# Patient Record
Sex: FEMALE | Race: BLACK OR AFRICAN AMERICAN | NOT HISPANIC OR LATINO | Employment: STUDENT | URBAN - METROPOLITAN AREA
[De-identification: names, ages, dates, MRNs, and addresses within clinical notes are randomized per-mention and may not be internally consistent; named-entity substitution may affect disease eponyms.]

---

## 2024-02-21 DIAGNOSIS — M67.01 ACHILLES TENDON CONTRACTURE, RIGHT: Primary | ICD-10-CM

## 2024-02-21 DIAGNOSIS — Q66.41: ICD-10-CM

## 2024-02-21 DIAGNOSIS — M21.41 ACQUIRED RIGHT FLAT FOOT: ICD-10-CM

## 2024-02-21 DIAGNOSIS — S96.811A RUPTURE OF PLANTARIS TENDON, RIGHT, INITIAL ENCOUNTER: ICD-10-CM

## 2024-02-21 RX ORDER — TRANEXAMIC ACID 10 MG/ML
1000 INJECTION, SOLUTION INTRAVENOUS ONCE
Status: CANCELLED | OUTPATIENT
Start: 2024-05-20 | End: 2024-02-21

## 2024-02-21 RX ORDER — CEFAZOLIN SODIUM 2 G/100ML
2 INJECTION, SOLUTION INTRAVENOUS ONCE
Status: CANCELLED | OUTPATIENT
Start: 2024-05-20 | End: 2024-02-21

## 2024-02-21 RX ORDER — SODIUM CHLORIDE, SODIUM LACTATE, POTASSIUM CHLORIDE, CALCIUM CHLORIDE 600; 310; 30; 20 MG/100ML; MG/100ML; MG/100ML; MG/100ML
100 INJECTION, SOLUTION INTRAVENOUS CONTINUOUS
Status: CANCELLED | OUTPATIENT
Start: 2024-05-20

## 2024-05-01 DIAGNOSIS — Z01.818 PREOP TESTING: Primary | ICD-10-CM

## 2024-05-06 ENCOUNTER — APPOINTMENT (OUTPATIENT)
Dept: ORTHOPEDIC SURGERY | Facility: CLINIC | Age: 19
End: 2024-05-06
Payer: COMMERCIAL

## 2024-05-06 ENCOUNTER — PRE-ADMISSION TESTING (OUTPATIENT)
Dept: PREADMISSION TESTING | Facility: HOSPITAL | Age: 19
End: 2024-05-06
Payer: COMMERCIAL

## 2024-05-06 VITALS
OXYGEN SATURATION: 99 % | HEART RATE: 62 BPM | RESPIRATION RATE: 18 BRPM | WEIGHT: 259.7 LBS | HEIGHT: 61 IN | TEMPERATURE: 97 F | BODY MASS INDEX: 49.03 KG/M2 | DIASTOLIC BLOOD PRESSURE: 84 MMHG | SYSTOLIC BLOOD PRESSURE: 160 MMHG

## 2024-05-06 DIAGNOSIS — M67.01 ACHILLES TENDON CONTRACTURE, RIGHT: ICD-10-CM

## 2024-05-06 DIAGNOSIS — M21.41 ACQUIRED RIGHT FLAT FOOT: ICD-10-CM

## 2024-05-06 DIAGNOSIS — Z01.818 PREOP TESTING: Primary | ICD-10-CM

## 2024-05-06 DIAGNOSIS — Q66.41: ICD-10-CM

## 2024-05-06 DIAGNOSIS — S96.811A RUPTURE OF PLANTARIS TENDON, RIGHT, INITIAL ENCOUNTER: ICD-10-CM

## 2024-05-06 LAB
ALBUMIN SERPL BCP-MCNC: 4 G/DL (ref 3.4–5)
ALP SERPL-CCNC: 76 U/L (ref 33–110)
ALT SERPL W P-5'-P-CCNC: 13 U/L (ref 7–45)
AMPHETAMINES UR QL SCN: NORMAL
ANION GAP SERPL CALC-SCNC: 10 MMOL/L (ref 10–20)
AST SERPL W P-5'-P-CCNC: 17 U/L (ref 9–39)
BARBITURATES UR QL SCN: NORMAL
BENZODIAZ UR QL SCN: NORMAL
BILIRUB SERPL-MCNC: 0.7 MG/DL (ref 0–1.2)
BUN SERPL-MCNC: 17 MG/DL (ref 6–23)
BZE UR QL SCN: NORMAL
CALCIUM SERPL-MCNC: 9 MG/DL (ref 8.6–10.3)
CANNABINOIDS UR QL SCN: NORMAL
CHLORIDE SERPL-SCNC: 107 MMOL/L (ref 98–107)
CO2 SERPL-SCNC: 23 MMOL/L (ref 21–32)
CREAT SERPL-MCNC: 0.76 MG/DL (ref 0.5–1.05)
EGFRCR SERPLBLD CKD-EPI 2021: >90 ML/MIN/1.73M*2
ERYTHROCYTE [DISTWIDTH] IN BLOOD BY AUTOMATED COUNT: 13 % (ref 11.5–14.5)
FENTANYL+NORFENTANYL UR QL SCN: NORMAL
GLUCOSE SERPL-MCNC: 96 MG/DL (ref 74–99)
HCT VFR BLD AUTO: 38.2 % (ref 36–46)
HGB BLD-MCNC: 12.3 G/DL (ref 12–16)
MCH RBC QN AUTO: 24.7 PG (ref 26–34)
MCHC RBC AUTO-ENTMCNC: 32.2 G/DL (ref 32–36)
MCV RBC AUTO: 77 FL (ref 80–100)
METHADONE UR QL SCN: NORMAL
NRBC BLD-RTO: 0 /100 WBCS (ref 0–0)
OPIATES UR QL SCN: NORMAL
OXYCODONE+OXYMORPHONE UR QL SCN: NORMAL
PCP UR QL SCN: NORMAL
PLATELET # BLD AUTO: 207 X10*3/UL (ref 150–450)
POTASSIUM SERPL-SCNC: 4.2 MMOL/L (ref 3.5–5.3)
PROT SERPL-MCNC: 7.1 G/DL (ref 6.4–8.2)
RBC # BLD AUTO: 4.97 X10*6/UL (ref 4–5.2)
SODIUM SERPL-SCNC: 136 MMOL/L (ref 136–145)
WBC # BLD AUTO: 3.1 X10*3/UL (ref 4.4–11.3)

## 2024-05-06 PROCEDURE — 93005 ELECTROCARDIOGRAM TRACING: CPT

## 2024-05-06 PROCEDURE — 99203 OFFICE O/P NEW LOW 30 MIN: CPT | Performed by: NURSE PRACTITIONER

## 2024-05-06 PROCEDURE — 85027 COMPLETE CBC AUTOMATED: CPT

## 2024-05-06 PROCEDURE — 87081 CULTURE SCREEN ONLY: CPT | Mod: PARLAB

## 2024-05-06 PROCEDURE — 36415 COLL VENOUS BLD VENIPUNCTURE: CPT

## 2024-05-06 PROCEDURE — 80307 DRUG TEST PRSMV CHEM ANLYZR: CPT

## 2024-05-06 PROCEDURE — 80053 COMPREHEN METABOLIC PANEL: CPT

## 2024-05-06 RX ORDER — LORATADINE 10 MG
10 TABLET,DISINTEGRATING ORAL DAILY
COMMUNITY

## 2024-05-06 RX ORDER — FLUTICASONE PROPIONATE 50 MCG
1 SPRAY, SUSPENSION (ML) NASAL DAILY
COMMUNITY

## 2024-05-06 RX ORDER — NORETHINDRONE ACETATE AND ETHINYL ESTRADIOL .5; 2.5 MG/1; UG/1
1 TABLET ORAL DAILY
COMMUNITY
End: 2024-05-06 | Stop reason: WASHOUT

## 2024-05-06 RX ORDER — CHLORHEXIDINE GLUCONATE ORAL RINSE 1.2 MG/ML
15 SOLUTION DENTAL DAILY
Qty: 30 ML | Refills: 0 | Status: SHIPPED | OUTPATIENT
Start: 2024-05-06 | End: 2024-05-08

## 2024-05-06 RX ORDER — FERROUS SULFATE 325(65) MG
65 TABLET, DELAYED RELEASE (ENTERIC COATED) ORAL
COMMUNITY
End: 2024-05-06 | Stop reason: WASHOUT

## 2024-05-06 RX ORDER — CHLORHEXIDINE GLUCONATE 40 MG/ML
SOLUTION TOPICAL DAILY
Qty: 118 ML | Refills: 0 | Status: SHIPPED | OUTPATIENT
Start: 2024-05-06 | End: 2024-05-11

## 2024-05-06 ASSESSMENT — DUKE ACTIVITY SCORE INDEX (DASI)
CAN YOU DO HEAVY WORK AROUND THE HOUSE LIKE SCRUBBING FLOORS OR LIFTING AND MOVING HEAVY FURNITURE: NO
CAN YOU WALK A BLOCK OR TWO ON LEVEL GROUND: YES
CAN YOU DO MODERATE WORK AROUND THE HOUSE LIKE VACUUMING, SWEEPING FLOORS OR CARRYING GROCERIES: YES
TOTAL_SCORE: 18.95
CAN YOU HAVE SEXUAL RELATIONS: NO
CAN YOU WALK INDOORS, SUCH AS AROUND YOUR HOUSE: YES
DASI METS SCORE: 5.1
CAN YOU DO LIGHT WORK AROUND THE HOUSE LIKE DUSTING OR WASHING DISHES: YES
CAN YOU PARTICIPATE IN MODERATE RECREATIONAL ACTIVITIES LIKE GOLF, BOWLING, DANCING, DOUBLES TENNIS OR THROWING A BASEBALL OR FOOTBALL: NO
CAN YOU PARTICIPATE IN STRENOUS SPORTS LIKE SWIMMING, SINGLES TENNIS, FOOTBALL, BASKETBALL, OR SKIING: NO
CAN YOU CLIMB A FLIGHT OF STAIRS OR WALK UP A HILL: YES
CAN YOU DO YARD WORK LIKE RAKING LEAVES, WEEDING OR PUSHING A MOWER: NO
CAN YOU TAKE CARE OF YOURSELF (EAT, DRESS, BATHE, OR USE TOILET): YES
CAN YOU RUN A SHORT DISTANCE: NO

## 2024-05-06 NOTE — CPM/PAT H&P
CPM/PAT Evaluation       Name: Merced Vicente (Merced Vicente)  /Age: 2005/ y.o.     In-Person       Chief Complaint: Right foot pain    Reports ongoing progressive pain worsened by activity including walking, standing and shoe wear.  Reports hx of fall down set of stairs back in 2023, felt immediate pain, swelling and bruising but thinking it was a sprain, didn't get medical attention until 2 months later when pain and swelling didn't resolve.  States than sought medical attention, was given a soft cast and been followed up by podiatry, including MRI showing need for surgery.  Currently having constant constant ache radiating from toes through back of foot with intermiittent sharp shooting worsen by walking, standing and shoes.  Currently wearing foot/ankle brace to Right foot.  Accompanied by mom Adrián who is attentive and supportive.  Patient remaining otherwise physically active, denies cardiac or respiratory symptoms.  No previous anesthesia.     Followed by PCP ([facility]) - last visit on 2022        No past medical history on file.    No past surgical history on file.    Patient  has no history on file for sexual activity.    No family history on file.    No Known Allergies    Prior to Admission medications    Medication Sig Start Date End Date Taking? Authorizing Provider   fluticasone (Flonase) 50 mcg/actuation nasal spray Administer 1 spray into each nostril once daily. Shake gently. Before first use, prime pump. After use, clean tip and replace cap.    Historical Provider, MD   loratadine (Claritin Reditabs) 10 mg disintegrating tablet Take 1 tablet (10 mg) by mouth once daily.    Historical Provider, MD   ferrous sulfate 325 (65 Fe) MG EC tablet Take 65 mg by mouth 3 times a day with meals. Do not crush, chew, or split.  24  Historical Provider, MD   norethindrone ac-eth estradioL (Femhrt Low Dose) 0.5-2.5 mg-mcg tablet Take 1 tablet by mouth once  daily.  5/6/24  Historical Provider, MD        Review of Systems    Constitutional: no fever, no chills and not feeling poorly.   Eyes: no eyesight problems.   ENT: no hearing loss, no nosebleeds and no sore throat.   Cardiovascular: no chest pain, no palpitations and no extremity edema.   Respiratory: no shortness of breath, no wheezing, no cough and no sob with exertion.   Gastrointestinal: negative for abdominal pain, blood in stools or changes in bowel habits   Genitourinary: negative for dysuria, incontinence or changes in urinary habits   Musculoskeletal: see HPI   Integumentary: negative for lesions, rash or itching.   Neurological: negative for confusion, dizziness, fainting or difficulty walking.   Psychiatric: not suicidal, no anxiety and no depression.   All other systems have been reviewed and are negative for complaint.     Physical Exam  Constitutional:       General: No acute distress.     Aox3, pleasant and cooperative, appropriate mood and eye contact   HENT:      Head: Normocephalic.      Mouth/Throat: Mucous membranes moist & pink  Eyes:      Vision grossly intact, PERRLA   Neck:      No carotid bruit, no JVD  Cardiovascular:      RRR, S1S2, no murmurs, rubs or gallops  Pulmonary:      Symmetric chest expansion, CTA, Room Air  Abdominal:      Soft non-tender, BSx4   Skin:     Warm, dry & intact   Extremities:      No gross deformities; mild abnormal gait; Right ankle/foot brace in use  Neurological:      No focal deficit, Aox3, HEIN x4  Psychiatric:      Pleasant & cooperative, appropriate affect    PAT AIRWAY:   Airway:     Mallampati::  I    TM distance::  >3 FB    Neck ROM::  Full  normal        Visit Vitals  /84   Pulse 62   Temp 36.1 °C (97 °F) (Temporal)   Resp 18       DASI Risk Score      Flowsheet Row Most Recent Value   DASI SCORE 18.95   METS Score (Will be calculated only when all the questions are answered) 5.1          Caprini DVT Assessment    No data to display       Modified  Frailty Index    No data to display       CHADS2 Stroke Risk  Current as of 11 minutes ago        N/A 3 to 100%: High Risk   2 to < 3%: Medium Risk   0 to < 2%: Low Risk     Last Change: N/A          This score determines the patient's risk of having a stroke if the patient has atrial fibrillation.        This score is not applicable to this patient. Components are not calculated.          Revised Cardiac Risk Index    No data to display       Apfel Simplified Score    No data to display       Risk Analysis Index Results This Encounter    No data found in the last 1 encounters.       Stop Bang Score      Flowsheet Row Most Recent Value   Do you often feel tired or fatigued after your sleep? 0   Has anyone ever observed you stop breathing in your sleep? 0   Do you have or are you being treated for high blood pressure? 0   Is your neck circumference greater than 17 inches (Male) or 16 inches (Female)? 1            Assessment and Plan:     Followed by podiatry, Right Achilles tendon contracture, Acquired Right flat foot, Rubpture of plantaris tendon, Talipes calcaneovalgus of Right foot    Right foot omari calcaneal osteotomy w/c-arm & cotton osteotomy, Right leg gastrocnemius recession, Right ankle posterior tibialis tendon repair & Right leg bone marrow aspirate harvest w/Dr Boyle on 5/20/2024    Reviewed todays ecg - NSR w/sinus arrhythmia (62 bpm)

## 2024-05-06 NOTE — PREPROCEDURE INSTRUCTIONS
Medication List            Accurate as of May 6, 2024  8:03 AM. Always use your most recent med list.                * chlorhexidine 0.12 % solution  Commonly known as: Peridex  Use 15 mL in the mouth or throat once daily for 2 doses. Swish and Spit day before surgery and again morning on day of surgery.     * chlorhexidine 4 % external liquid  Commonly known as: Hibiclens  Apply topically once daily for 5 days. Wash daily for 5 days prior to surgery with day 5 being morning of surgery.     fluticasone 50 mcg/actuation nasal spray  Commonly known as: Flonase  Medication Adjustments for Surgery: Other (Comment)  Notes to patient: Continue as prescribed     loratadine 10 mg disintegrating tablet  Commonly known as: Claritin Reditabs  Medication Adjustments for Surgery: Other (Comment)  Notes to patient: Continue as prescribed           * This list has 2 medication(s) that are the same as other medications prescribed for you. Read the directions carefully, and ask your doctor or other care provider to review them with you.                PRE-OPERATIVE INSTRUCTIONS FOR SURGERY    *Do not eat anything after midnight the night of surgery.  This includes food of any kind (including hard candy, cough drops, mints).   You may have up to 13.5 ounces of clear liquid  until TWO hours prior to your arrival time to the hospital.  This includes water, black tea/coffee, (no milk or cream) apple juice and electrolyte drinks (GATORADE).  You may chew gum until TWO hours prior you your surgery/procedure.   -------------------------------------------------    *One of our staff members will call you ONE business day before your surgery, between 11 am-2 pm to let you know the time to arrive.    If you have not received a call by 2 pm, call [phone]    *When you arrive at the hospital-->GO TO Registration on the ground floor    *Stop smoking 24 hours prior to surgery.    No Marijuana, CBD Oil or Vaping for 48 hours    *No alcohol  24 hours prior to surgery    *You will need a responsible adult to drive you home    -No acrylic nails or nail polish on at least one fingernail, NO polish on toes for foot surgery    -You may be asked to remove your dentures, partial plate, eyeglasses or contact lenses before going to surgery.  Please bring a case for these items.    -Body piercings need to be removed.  Jewelry and valuables should be left at home.    -Put on loose,  comfortable, clean clothing, that will accommodate bandages  -------------------------------------------------      What is a home antibacterial shower?  This shower is a way of cleaning the skin with a germ killing solution before surgery.  The solution contains chlorhexidine, commonly known as CHG.  CHG is a skin cleanser with germ killing ability.  Let your doctor know if you are allergic to chlorhexidine.    Why do I need to take a preoperative antibacterial shower?  Skin is not sterile.  It is best to try to make your skin as free of germs as possible before surgery.  Proper cleansing with a germ killing soap before surgery can lower the number of germs on your skin.  This helps to reduce the risk of infection at the surgical site.  Following the instructions listed below will help you prepare your skin for surgery.      How do I use the solution?    Steps: Begin using your CHG soap 5 days before your surgery on __________________.    BE SURE TO  BOTH THE SPECIALTY PRESURGICAL SOAP AND MOUTHWASH FROM YOUR PHARMACY.  READ THE DIRECTIONS AND USE ACCORDINGLY.     PRESURGICAL SOAP USE:  *First, wash and rinse your hair using the CHG soap.  Keep CHG soap away from ear canals and eyes.   Rinse completely, do not condition.  Hair extensions should be removed.    *Wash your face with your normal soap and rinse.   *Apply the CHG solution to a clean wet washcloth.  Turn the water off or move away from the water spray to avoid premature rinsing of the CHG soap as you are applying.   Firmly lather your entire body from the neck down.  Do not use on your face.    *Pay special attention to the area(s) where your incision(s) will be located unless they are on your face.  Avoid scrubbing your skin too hard.  The important part is to have the CHG soap sit on your skin for 3 minutes.   *When the 3 minutes are up, turn on the water and rinse the CHG solution off your body completely.  *Do not wash with regular soap after you  have  used the CHG soap solution.  *Pat  yourself dry with a clean, freshly laundered towel.  *Do not apply powders, deodorants or lotions.  *Dress in clean freshly laundered night clothes.    *Be sure to sleep with clean freshly laundered sheets.    *Be aware the CHG will cause stains on fabrics; if you wash them with bleach after use.  Rinse your washcloth and other linens that have contact with CHG completely.  Use only non-chlorine detergents to launder the items  used.  *The morning of surgery is the fifth day.  Repeat the above steps and dress in clean comfortable clothing.     What is oral/dental rinse?  It is mouthwash.  It is a way of cleaning the he mouth with a germ-killing solution before your surgery.  The solution contains chlorhexidine, commonly known as CHG.  It is used inside the mouth to kill a bacteria known as Staphylococcus aureus.  Let your doctor know if you are allergic to Chlorhexidine.    Why do I need to use CHG oral/ dental rinse?  The CHG oral/dental rinse helps to kill bacteria in your mouth know as Staphylococcus aureus.  This reduces the risk of infection at the surgical site.    Using your CHG oral/dental rinse    STEPS:    Use your CHG oral/dental rinse after you brush your teeth the night before (at bedtime) and the morning of your surgery.  Follow all the directions on your prescription label.  *Use the cap on the container to measure 15 ml  *Swish (gargle if you can) the mouthwash in your mouth for at least 30 seconds, (do not swallow) and spit  out  *After you use your CHG rinse, do not rinse your mouth with water, drink or eat.  Please refer to the prescription label for the appropriate time to resume oral intake.    What side effects might I have using the CHG oral/dental rinse?  CHG rinse will stick you plaque on the teeth.  Brush and floss just before use.   Teeth brushing will help avoid staining of the plaque during  use.  Teeth brushing will help avoid staining of plaque during  use.    Who should I contact if I have questions about the CHG oral/dental rinse and or CHG soap?  Please call [facility], Pre-Admission testing at [phone] if you have any questions.  -------------------------------------------------    What you may be asked to bring to surgery:    ___Crutches  ___photo ID and insurance information  ___Urine specimen   NPO Instructions:    Do not eat any food after midnight the night before your surgery/procedure.  You may have clear liquids until TWO hours before surgery/procedure. This includes water, black tea/coffee, (no milk or cream) apple juice and electrolyte drinks (Gatorade).  You may chew gum up to TWO hours before your surgery/procedure.    Additional Instructions:     Day of Surgery:  You may have clear liquids until TWO hours before surgery/procedure.  This includes water, black tea/coffee, (no milk or cream) apple juice and electrolyte drinks (Gatorade)  You may chew gum up to TWO hours before your surgery/procedure  Wear  comfortable loose fitting clothing  Do not use moisturizers, creams, lotions or perfume  All jewelry and valuables should be left at home

## 2024-05-06 NOTE — H&P (VIEW-ONLY)
CPM/PAT Evaluation       Name: Merced Vicente (Merced Vicente)  /Age: 2005/ y.o.     In-Person       Chief Complaint: Right foot pain    Reports ongoing progressive pain worsened by activity including walking, standing and shoe wear.  Reports hx of fall down set of stairs back in 2023, felt immediate pain, swelling and bruising but thinking it was a sprain, didn't get medical attention until 2 months later when pain and swelling didn't resolve.  States than sought medical attention, was given a soft cast and been followed up by podiatry, including MRI showing need for surgery.  Currently having constant constant ache radiating from toes through back of foot with intermiittent sharp shooting worsen by walking, standing and shoes.  Currently wearing foot/ankle brace to Right foot.  Accompanied by mom Adrián who is attentive and supportive.  Patient remaining otherwise physically active, denies cardiac or respiratory symptoms.  No previous anesthesia.     Followed by PCP (Al[facility]) - last visit on 2022        No past medical history on file.    No past surgical history on file.    Patient  has no history on file for sexual activity.    No family history on file.    No Known Allergies    Prior to Admission medications    Medication Sig Start Date End Date Taking? Authorizing Provider   fluticasone (Flonase) 50 mcg/actuation nasal spray Administer 1 spray into each nostril once daily. Shake gently. Before first use, prime pump. After use, clean tip and replace cap.    Historical Provider, MD   loratadine (Claritin Reditabs) 10 mg disintegrating tablet Take 1 tablet (10 mg) by mouth once daily.    Historical Provider, MD   ferrous sulfate 325 (65 Fe) MG EC tablet Take 65 mg by mouth 3 times a day with meals. Do not crush, chew, or split.  24  Historical Provider, MD   norethindrone ac-eth estradioL (Femhrt Low Dose) 0.5-2.5 mg-mcg tablet Take 1 tablet by mouth once  daily.  5/6/24  Historical Provider, MD        Review of Systems    Constitutional: no fever, no chills and not feeling poorly.   Eyes: no eyesight problems.   ENT: no hearing loss, no nosebleeds and no sore throat.   Cardiovascular: no chest pain, no palpitations and no extremity edema.   Respiratory: no shortness of breath, no wheezing, no cough and no sob with exertion.   Gastrointestinal: negative for abdominal pain, blood in stools or changes in bowel habits   Genitourinary: negative for dysuria, incontinence or changes in urinary habits   Musculoskeletal: see HPI   Integumentary: negative for lesions, rash or itching.   Neurological: negative for confusion, dizziness, fainting or difficulty walking.   Psychiatric: not suicidal, no anxiety and no depression.   All other systems have been reviewed and are negative for complaint.     Physical Exam  Constitutional:       General: No acute distress.     Aox3, pleasant and cooperative, appropriate mood and eye contact   HENT:      Head: Normocephalic.      Mouth/Throat: Mucous membranes moist & pink  Eyes:      Vision grossly intact, PERRLA   Neck:      No carotid bruit, no JVD  Cardiovascular:      RRR, S1S2, no murmurs, rubs or gallops  Pulmonary:      Symmetric chest expansion, CTA, Room Air  Abdominal:      Soft non-tender, BSx4   Skin:     Warm, dry & intact   Extremities:      No gross deformities; mild abnormal gait; Right ankle/foot brace in use  Neurological:      No focal deficit, Aox3, HEIN x4  Psychiatric:      Pleasant & cooperative, appropriate affect    PAT AIRWAY:   Airway:     Mallampati::  I    TM distance::  >3 FB    Neck ROM::  Full  normal        Visit Vitals  /84   Pulse 62   Temp 36.1 °C (97 °F) (Temporal)   Resp 18       DASI Risk Score      Flowsheet Row Most Recent Value   DASI SCORE 18.95   METS Score (Will be calculated only when all the questions are answered) 5.1          Caprini DVT Assessment    No data to display       Modified  Frailty Index    No data to display       CHADS2 Stroke Risk  Current as of 11 minutes ago        N/A 3 to 100%: High Risk   2 to < 3%: Medium Risk   0 to < 2%: Low Risk     Last Change: N/A          This score determines the patient's risk of having a stroke if the patient has atrial fibrillation.        This score is not applicable to this patient. Components are not calculated.          Revised Cardiac Risk Index    No data to display       Apfel Simplified Score    No data to display       Risk Analysis Index Results This Encounter    No data found in the last 1 encounters.       Stop Bang Score      Flowsheet Row Most Recent Value   Do you often feel tired or fatigued after your sleep? 0   Has anyone ever observed you stop breathing in your sleep? 0   Do you have or are you being treated for high blood pressure? 0   Is your neck circumference greater than 17 inches (Male) or 16 inches (Female)? 1            Assessment and Plan:     Followed by podiatry, Right Achilles tendon contracture, Acquired Right flat foot, Rubpture of plantaris tendon, Talipes calcaneovalgus of Right foot    Right foot omari calcaneal osteotomy w/c-arm & cotton osteotomy, Right leg gastrocnemius recession, Right ankle posterior tibialis tendon repair & Right leg bone marrow aspirate harvest w/Dr Boyle on 5/20/2024    Reviewed todays ecg - NSR w/sinus arrhythmia (62 bpm)

## 2024-05-07 LAB — STAPHYLOCOCCUS SPEC CULT: NORMAL

## 2024-05-18 ENCOUNTER — ANESTHESIA EVENT (OUTPATIENT)
Dept: OPERATING ROOM | Facility: HOSPITAL | Age: 19
End: 2024-05-18
Payer: COMMERCIAL

## 2024-05-19 LAB
ATRIAL RATE: 62 BPM
P AXIS: 49 DEGREES
P OFFSET: 183 MS
P ONSET: 148 MS
PR INTERVAL: 144 MS
Q ONSET: 220 MS
QRS COUNT: 10 BEATS
QRS DURATION: 70 MS
QT INTERVAL: 418 MS
QTC CALCULATION(BAZETT): 424 MS
QTC FREDERICIA: 422 MS
R AXIS: 61 DEGREES
T AXIS: 7 DEGREES
T OFFSET: 429 MS
VENTRICULAR RATE: 62 BPM

## 2024-05-20 ENCOUNTER — HOSPITAL ENCOUNTER (OUTPATIENT)
Facility: HOSPITAL | Age: 19
Setting detail: OUTPATIENT SURGERY
Discharge: HOME | End: 2024-05-20
Attending: PODIATRIST | Admitting: PODIATRIST
Payer: COMMERCIAL

## 2024-05-20 ENCOUNTER — ANESTHESIA (OUTPATIENT)
Dept: OPERATING ROOM | Facility: HOSPITAL | Age: 19
End: 2024-05-20
Payer: COMMERCIAL

## 2024-05-20 ENCOUNTER — APPOINTMENT (OUTPATIENT)
Dept: RADIOLOGY | Facility: HOSPITAL | Age: 19
End: 2024-05-20
Payer: COMMERCIAL

## 2024-05-20 VITALS
HEIGHT: 61 IN | SYSTOLIC BLOOD PRESSURE: 124 MMHG | BODY MASS INDEX: 49.03 KG/M2 | RESPIRATION RATE: 16 BRPM | TEMPERATURE: 97.3 F | WEIGHT: 259.7 LBS | DIASTOLIC BLOOD PRESSURE: 59 MMHG | HEART RATE: 58 BPM | OXYGEN SATURATION: 98 %

## 2024-05-20 DIAGNOSIS — M67.01 ACHILLES TENDON CONTRACTURE, RIGHT: ICD-10-CM

## 2024-05-20 DIAGNOSIS — S96.811A RUPTURE OF PLANTARIS TENDON, RIGHT, INITIAL ENCOUNTER: ICD-10-CM

## 2024-05-20 DIAGNOSIS — Q66.41: Primary | ICD-10-CM

## 2024-05-20 DIAGNOSIS — M21.41 ACQUIRED RIGHT FLAT FOOT: ICD-10-CM

## 2024-05-20 PROBLEM — E66.813 CLASS 3 SEVERE OBESITY IN ADULT: Status: ACTIVE | Noted: 2024-05-20

## 2024-05-20 PROBLEM — E66.01 CLASS 3 SEVERE OBESITY IN ADULT (MULTI): Status: ACTIVE | Noted: 2024-05-20

## 2024-05-20 LAB — PREGNANCY TEST URINE, POC: NEGATIVE

## 2024-05-20 PROCEDURE — 7100000001 HC RECOVERY ROOM TIME - INITIAL BASE CHARGE: Performed by: PODIATRIST

## 2024-05-20 PROCEDURE — 2500000004 HC RX 250 GENERAL PHARMACY W/ HCPCS (ALT 636 FOR OP/ED): Performed by: ANESTHESIOLOGY

## 2024-05-20 PROCEDURE — 2500000004 HC RX 250 GENERAL PHARMACY W/ HCPCS (ALT 636 FOR OP/ED): Performed by: NURSE ANESTHETIST, CERTIFIED REGISTERED

## 2024-05-20 PROCEDURE — A4217 STERILE WATER/SALINE, 500 ML: HCPCS | Performed by: PODIATRIST

## 2024-05-20 PROCEDURE — 3600000004 HC OR TIME - INITIAL BASE CHARGE - PROCEDURE LEVEL FOUR: Performed by: PODIATRIST

## 2024-05-20 PROCEDURE — 7100000002 HC RECOVERY ROOM TIME - EACH INCREMENTAL 1 MINUTE: Performed by: PODIATRIST

## 2024-05-20 PROCEDURE — 76000 FLUOROSCOPY <1 HR PHYS/QHP: CPT | Mod: 59

## 2024-05-20 PROCEDURE — 2720000007 HC OR 272 NO HCPCS: Performed by: PODIATRIST

## 2024-05-20 PROCEDURE — 3700000002 HC GENERAL ANESTHESIA TIME - EACH INCREMENTAL 1 MINUTE: Performed by: PODIATRIST

## 2024-05-20 PROCEDURE — 2500000005 HC RX 250 GENERAL PHARMACY W/O HCPCS: Performed by: PODIATRIST

## 2024-05-20 PROCEDURE — C1889 IMPLANT/INSERT DEVICE, NOC: HCPCS | Performed by: PODIATRIST

## 2024-05-20 PROCEDURE — C1713 ANCHOR/SCREW BN/BN,TIS/BN: HCPCS | Performed by: PODIATRIST

## 2024-05-20 PROCEDURE — 7100000009 HC PHASE TWO TIME - INITIAL BASE CHARGE: Performed by: PODIATRIST

## 2024-05-20 PROCEDURE — 64447 NJX AA&/STRD FEMORAL NRV IMG: CPT | Performed by: ANESTHESIOLOGY

## 2024-05-20 PROCEDURE — A28300 PR OSTEOTOMY HEEL BONE: Performed by: NURSE ANESTHETIST, CERTIFIED REGISTERED

## 2024-05-20 PROCEDURE — 3700000001 HC GENERAL ANESTHESIA TIME - INITIAL BASE CHARGE: Performed by: PODIATRIST

## 2024-05-20 PROCEDURE — 7100000010 HC PHASE TWO TIME - EACH INCREMENTAL 1 MINUTE: Performed by: PODIATRIST

## 2024-05-20 PROCEDURE — 2500000004 HC RX 250 GENERAL PHARMACY W/ HCPCS (ALT 636 FOR OP/ED): Performed by: PODIATRIST

## 2024-05-20 PROCEDURE — A28300 PR OSTEOTOMY HEEL BONE: Performed by: ANESTHESIOLOGY

## 2024-05-20 PROCEDURE — 3600000009 HC OR TIME - EACH INCREMENTAL 1 MINUTE - PROCEDURE LEVEL FOUR: Performed by: PODIATRIST

## 2024-05-20 PROCEDURE — 81025 URINE PREGNANCY TEST: CPT | Performed by: ANESTHESIOLOGY

## 2024-05-20 PROCEDURE — 2780000003 HC OR 278 NO HCPCS: Performed by: PODIATRIST

## 2024-05-20 PROCEDURE — 64445 NJX AA&/STRD SCIATIC NRV IMG: CPT | Performed by: ANESTHESIOLOGY

## 2024-05-20 PROCEDURE — 2500000005 HC RX 250 GENERAL PHARMACY W/O HCPCS: Performed by: NURSE ANESTHETIST, CERTIFIED REGISTERED

## 2024-05-20 DEVICE — IMPLANTABLE DEVICE: Type: IMPLANTABLE DEVICE | Site: ANKLE | Status: FUNCTIONAL

## 2024-05-20 DEVICE — IMPLANTABLE DEVICE: Type: IMPLANTABLE DEVICE | Site: ANKLE | Status: NON-FUNCTIONAL

## 2024-05-20 RX ORDER — LIDOCAINE HYDROCHLORIDE 20 MG/ML
INJECTION, SOLUTION INFILTRATION; PERINEURAL AS NEEDED
Status: DISCONTINUED | OUTPATIENT
Start: 2024-05-20 | End: 2024-05-20

## 2024-05-20 RX ORDER — DEXMEDETOMIDINE HYDROCHLORIDE 100 UG/ML
INJECTION, SOLUTION INTRAVENOUS AS NEEDED
Status: DISCONTINUED | OUTPATIENT
Start: 2024-05-20 | End: 2024-05-20

## 2024-05-20 RX ORDER — HYDROMORPHONE HYDROCHLORIDE 1 MG/ML
1 INJECTION, SOLUTION INTRAMUSCULAR; INTRAVENOUS; SUBCUTANEOUS EVERY 5 MIN PRN
Status: DISCONTINUED | OUTPATIENT
Start: 2024-05-20 | End: 2024-05-20 | Stop reason: HOSPADM

## 2024-05-20 RX ORDER — SODIUM CHLORIDE, SODIUM LACTATE, POTASSIUM CHLORIDE, CALCIUM CHLORIDE 600; 310; 30; 20 MG/100ML; MG/100ML; MG/100ML; MG/100ML
100 INJECTION, SOLUTION INTRAVENOUS CONTINUOUS
Status: DISCONTINUED | OUTPATIENT
Start: 2024-05-20 | End: 2024-05-20 | Stop reason: HOSPADM

## 2024-05-20 RX ORDER — PROPOFOL 10 MG/ML
INJECTION, EMULSION INTRAVENOUS AS NEEDED
Status: DISCONTINUED | OUTPATIENT
Start: 2024-05-20 | End: 2024-05-20

## 2024-05-20 RX ORDER — LABETALOL HYDROCHLORIDE 5 MG/ML
5 INJECTION, SOLUTION INTRAVENOUS ONCE AS NEEDED
Status: DISCONTINUED | OUTPATIENT
Start: 2024-05-20 | End: 2024-05-20 | Stop reason: HOSPADM

## 2024-05-20 RX ORDER — KETOROLAC TROMETHAMINE 30 MG/ML
INJECTION, SOLUTION INTRAMUSCULAR; INTRAVENOUS AS NEEDED
Status: DISCONTINUED | OUTPATIENT
Start: 2024-05-20 | End: 2024-05-20

## 2024-05-20 RX ORDER — DIPHENHYDRAMINE HYDROCHLORIDE 50 MG/ML
12.5 INJECTION INTRAMUSCULAR; INTRAVENOUS ONCE AS NEEDED
Status: DISCONTINUED | OUTPATIENT
Start: 2024-05-20 | End: 2024-05-20 | Stop reason: HOSPADM

## 2024-05-20 RX ORDER — ROCURONIUM BROMIDE 10 MG/ML
INJECTION, SOLUTION INTRAVENOUS AS NEEDED
Status: DISCONTINUED | OUTPATIENT
Start: 2024-05-20 | End: 2024-05-20

## 2024-05-20 RX ORDER — OXYCODONE AND ACETAMINOPHEN 5; 325 MG/1; MG/1
1 TABLET ORAL EVERY 8 HOURS PRN
Qty: 21 TABLET | Refills: 0 | Status: SHIPPED | OUTPATIENT
Start: 2024-05-20 | End: 2024-05-27

## 2024-05-20 RX ORDER — ONDANSETRON HYDROCHLORIDE 2 MG/ML
INJECTION, SOLUTION INTRAVENOUS AS NEEDED
Status: DISCONTINUED | OUTPATIENT
Start: 2024-05-20 | End: 2024-05-20

## 2024-05-20 RX ORDER — HYDRALAZINE HYDROCHLORIDE 20 MG/ML
5 INJECTION INTRAMUSCULAR; INTRAVENOUS EVERY 30 MIN PRN
Status: DISCONTINUED | OUTPATIENT
Start: 2024-05-20 | End: 2024-05-20 | Stop reason: HOSPADM

## 2024-05-20 RX ORDER — ASPIRIN 81 MG
100 TABLET, DELAYED RELEASE (ENTERIC COATED) ORAL 2 TIMES DAILY
Qty: 10 TABLET | Refills: 0 | Status: SHIPPED | OUTPATIENT
Start: 2024-05-20 | End: 2024-05-25

## 2024-05-20 RX ORDER — FENTANYL CITRATE 50 UG/ML
INJECTION, SOLUTION INTRAMUSCULAR; INTRAVENOUS
Status: DISCONTINUED
Start: 2024-05-20 | End: 2024-05-20 | Stop reason: HOSPADM

## 2024-05-20 RX ORDER — TRANEXAMIC ACID 10 MG/ML
INJECTION, SOLUTION INTRAVENOUS AS NEEDED
Status: DISCONTINUED | OUTPATIENT
Start: 2024-05-20 | End: 2024-05-20

## 2024-05-20 RX ORDER — CALCIUM CHLORIDE INJECTION 100 MG/ML
INJECTION, SOLUTION INTRAVENOUS AS NEEDED
Status: DISCONTINUED | OUTPATIENT
Start: 2024-05-20 | End: 2024-05-20 | Stop reason: HOSPADM

## 2024-05-20 RX ORDER — LIDOCAINE HYDROCHLORIDE 20 MG/ML
INJECTION, SOLUTION EPIDURAL; INFILTRATION; INTRACAUDAL; PERINEURAL AS NEEDED
Status: DISCONTINUED | OUTPATIENT
Start: 2024-05-20 | End: 2024-05-20

## 2024-05-20 RX ORDER — MIDAZOLAM HYDROCHLORIDE 1 MG/ML
INJECTION, SOLUTION INTRAMUSCULAR; INTRAVENOUS AS NEEDED
Status: DISCONTINUED | OUTPATIENT
Start: 2024-05-20 | End: 2024-05-20

## 2024-05-20 RX ORDER — ACETAMINOPHEN 325 MG/1
650 TABLET ORAL EVERY 4 HOURS PRN
Status: DISCONTINUED | OUTPATIENT
Start: 2024-05-20 | End: 2024-05-20 | Stop reason: HOSPADM

## 2024-05-20 RX ORDER — MEPERIDINE HYDROCHLORIDE 25 MG/ML
12.5 INJECTION INTRAMUSCULAR; INTRAVENOUS; SUBCUTANEOUS EVERY 10 MIN PRN
Status: DISCONTINUED | OUTPATIENT
Start: 2024-05-20 | End: 2024-05-20 | Stop reason: HOSPADM

## 2024-05-20 RX ORDER — ASCORBIC ACID 500 MG
500 TABLET ORAL 2 TIMES DAILY
Qty: 60 TABLET | Refills: 0 | Status: SHIPPED | OUTPATIENT
Start: 2024-05-20 | End: 2024-06-19

## 2024-05-20 RX ORDER — ONDANSETRON HYDROCHLORIDE 2 MG/ML
4 INJECTION, SOLUTION INTRAVENOUS ONCE AS NEEDED
Status: DISCONTINUED | OUTPATIENT
Start: 2024-05-20 | End: 2024-05-20 | Stop reason: HOSPADM

## 2024-05-20 RX ORDER — CEFAZOLIN SODIUM 2 G/100ML
2 INJECTION, SOLUTION INTRAVENOUS ONCE
Status: DISCONTINUED | OUTPATIENT
Start: 2024-05-20 | End: 2024-05-20 | Stop reason: HOSPADM

## 2024-05-20 RX ORDER — CEFAZOLIN 1 G/1
INJECTION, POWDER, FOR SOLUTION INTRAVENOUS AS NEEDED
Status: DISCONTINUED | OUTPATIENT
Start: 2024-05-20 | End: 2024-05-20

## 2024-05-20 RX ORDER — MIDAZOLAM HYDROCHLORIDE 1 MG/ML
INJECTION, SOLUTION INTRAMUSCULAR; INTRAVENOUS
Status: DISCONTINUED
Start: 2024-05-20 | End: 2024-05-20 | Stop reason: HOSPADM

## 2024-05-20 RX ORDER — SODIUM CHLORIDE 0.9 G/100ML
IRRIGANT IRRIGATION AS NEEDED
Status: DISCONTINUED | OUTPATIENT
Start: 2024-05-20 | End: 2024-05-20 | Stop reason: HOSPADM

## 2024-05-20 RX ORDER — FENTANYL CITRATE 50 UG/ML
INJECTION, SOLUTION INTRAMUSCULAR; INTRAVENOUS AS NEEDED
Status: DISCONTINUED | OUTPATIENT
Start: 2024-05-20 | End: 2024-05-20

## 2024-05-20 RX ADMIN — CEFAZOLIN 2 G: 330 INJECTION, POWDER, FOR SOLUTION INTRAMUSCULAR; INTRAVENOUS at 07:55

## 2024-05-20 RX ADMIN — ONDANSETRON 4 MG: 2 INJECTION, SOLUTION INTRAMUSCULAR; INTRAVENOUS at 08:10

## 2024-05-20 RX ADMIN — DEXMEDETOMIDINE HYDROCHLORIDE 10 MCG: 100 INJECTION, SOLUTION, CONCENTRATE INTRAVENOUS at 10:42

## 2024-05-20 RX ADMIN — SODIUM CHLORIDE, POTASSIUM CHLORIDE, SODIUM LACTATE AND CALCIUM CHLORIDE: 600; 310; 30; 20 INJECTION, SOLUTION INTRAVENOUS at 07:46

## 2024-05-20 RX ADMIN — PROPOFOL 150 MG: 10 INJECTION, EMULSION INTRAVENOUS at 07:51

## 2024-05-20 RX ADMIN — FENTANYL CITRATE 100 MCG: 50 INJECTION, SOLUTION INTRAMUSCULAR; INTRAVENOUS at 07:20

## 2024-05-20 RX ADMIN — LIDOCAINE HYDROCHLORIDE 40 MG: 20 INJECTION, SOLUTION EPIDURAL; INFILTRATION; INTRACAUDAL; PERINEURAL at 10:00

## 2024-05-20 RX ADMIN — LIDOCAINE HYDROCHLORIDE 40 MG: 20 INJECTION, SOLUTION INFILTRATION; PERINEURAL at 09:58

## 2024-05-20 RX ADMIN — PROPOFOL 50 MG: 10 INJECTION, EMULSION INTRAVENOUS at 07:55

## 2024-05-20 RX ADMIN — FENTANYL CITRATE 25 MCG: 50 INJECTION, SOLUTION INTRAMUSCULAR; INTRAVENOUS at 09:26

## 2024-05-20 RX ADMIN — MIDAZOLAM 2 MG: 1 INJECTION INTRAMUSCULAR; INTRAVENOUS at 07:20

## 2024-05-20 RX ADMIN — LIDOCAINE HYDROCHLORIDE 60 MG: 20 INJECTION, SOLUTION INFILTRATION; PERINEURAL at 07:51

## 2024-05-20 RX ADMIN — FENTANYL CITRATE 100 MCG: 50 INJECTION, SOLUTION INTRAMUSCULAR; INTRAVENOUS at 07:51

## 2024-05-20 RX ADMIN — SODIUM CHLORIDE, POTASSIUM CHLORIDE, SODIUM LACTATE AND CALCIUM CHLORIDE 100 ML/HR: 600; 310; 30; 20 INJECTION, SOLUTION INTRAVENOUS at 06:28

## 2024-05-20 RX ADMIN — KETOROLAC TROMETHAMINE 30 MG: 30 INJECTION, SOLUTION INTRAMUSCULAR; INTRAVENOUS at 10:12

## 2024-05-20 RX ADMIN — MIDAZOLAM 2 MG: 1 INJECTION INTRAMUSCULAR; INTRAVENOUS at 07:51

## 2024-05-20 RX ADMIN — HYDROMORPHONE HYDROCHLORIDE 1 MG: 1 INJECTION, SOLUTION INTRAMUSCULAR; INTRAVENOUS; SUBCUTANEOUS at 11:42

## 2024-05-20 RX ADMIN — SUGAMMADEX 300 MG: 100 INJECTION, SOLUTION INTRAVENOUS at 10:42

## 2024-05-20 RX ADMIN — POVIDONE-IODINE 1 APPLICATION: 5 SOLUTION TOPICAL at 06:28

## 2024-05-20 RX ADMIN — DEXAMETHASONE SODIUM PHOSPHATE 8 MG: 4 INJECTION, SOLUTION INTRAMUSCULAR; INTRAVENOUS at 08:10

## 2024-05-20 RX ADMIN — ROCURONIUM BROMIDE 60 MG: 50 INJECTION, SOLUTION INTRAVENOUS at 07:51

## 2024-05-20 RX ADMIN — TRANEXAMIC ACID 1000 MG: 10 INJECTION, SOLUTION INTRAVENOUS at 10:04

## 2024-05-20 SDOH — HEALTH STABILITY: MENTAL HEALTH: CURRENT SMOKER: 0

## 2024-05-20 ASSESSMENT — PAIN SCALES - GENERAL
PAINLEVEL_OUTOF10: 0 - NO PAIN
PAINLEVEL_OUTOF10: 2
PAINLEVEL_OUTOF10: 0 - NO PAIN
PAINLEVEL_OUTOF10: 7
PAINLEVEL_OUTOF10: 0 - NO PAIN

## 2024-05-20 ASSESSMENT — PAIN - FUNCTIONAL ASSESSMENT
PAIN_FUNCTIONAL_ASSESSMENT: 0-10
PAIN_FUNCTIONAL_ASSESSMENT: VAS (VISUAL ANALOG SCALE)
PAIN_FUNCTIONAL_ASSESSMENT: 0-10

## 2024-05-20 ASSESSMENT — COLUMBIA-SUICIDE SEVERITY RATING SCALE - C-SSRS
6. HAVE YOU EVER DONE ANYTHING, STARTED TO DO ANYTHING, OR PREPARED TO DO ANYTHING TO END YOUR LIFE?: NO
2. HAVE YOU ACTUALLY HAD ANY THOUGHTS OF KILLING YOURSELF?: NO
1. IN THE PAST MONTH, HAVE YOU WISHED YOU WERE DEAD OR WISHED YOU COULD GO TO SLEEP AND NOT WAKE UP?: NO

## 2024-05-20 ASSESSMENT — PAIN DESCRIPTION - LOCATION: LOCATION: FOOT

## 2024-05-20 ASSESSMENT — PAIN DESCRIPTION - ORIENTATION: ORIENTATION: RIGHT

## 2024-05-20 NOTE — ANESTHESIA POSTPROCEDURE EVALUATION
Patient: Merced Vicente    Procedure Summary       Date: 05/20/24 Room / Location: [facility] OR 02 / Virtual [facility] OR    Anesthesia Start: 0746 Anesthesia Stop: 1052    Procedures:       RIGHT FOOT URBINA CALCANEAL OSTEOTOMY WITH C-ARM (Right: Foot)      COTTON OSTEOTOMY (Right: Foot)      RIGHT LEG GASTROCNEMIUS RECESSION (Right: Leg Lower)      RIGHT ANKLE POSTERIOR TIBIALIS TENDON REPAIR (Right: Ankle)      RIGHT LEG BONE MARROW ASPIRATE HARVEST (Right: Leg Lower) Diagnosis:       Achilles tendon contracture, right      Acquired right flat foot      Rupture of plantaris tendon, right, initial encounter      Talipes calcaneovalgus of right foot      (Achilles tendon contracture, right [M67.01])      (Acquired right flat foot [M21.41])      (Rupture of plantaris tendon, right, initial encounter [S96.811A])      (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])    Surgeons: Duane J Ehredt, DPM Responsible Provider: Sebas Benito MD    Anesthesia Type: general, regional ASA Status: 3            Anesthesia Type: general, regional    Vitals Value Taken Time   /61 05/20/24 1100   Temp 36.3 °C (97.3 °F) 05/20/24 1049   Pulse 75 05/20/24 1112   Resp 16 05/20/24 1100   SpO2 100 % 05/20/24 1112   Vitals shown include unfiled device data.    Anesthesia Post Evaluation    Patient location during evaluation: PACU  Patient participation: complete - patient participated  Level of consciousness: awake and alert  Pain management: adequate  Airway patency: patent  Cardiovascular status: acceptable  Respiratory status: acceptable  Hydration status: acceptable  Postoperative Nausea and Vomiting: none        There were no known notable events for this encounter.

## 2024-05-20 NOTE — ANESTHESIA PROCEDURE NOTES
Peripheral Block    Patient location during procedure: pre-op  Start time: 5/20/2024 7:20 AM  End time: 5/20/2024 7:30 AM  Reason for block: at surgeon's request and post-op pain management  Staffing  Performed: attending   Authorized by: Sebas Benito MD    Performed by: Sebas Benito MD  Preanesthetic Checklist  Completed: patient identified, IV checked, site marked, risks and benefits discussed, surgical consent, monitors and equipment checked, pre-op evaluation and timeout performed   Timeout performed at: 5/20/2024 7:20 AM  Peripheral Block  Patient position: laying flat  Prep: ChloraPrep  Patient monitoring: heart rate, cardiac monitor and continuous pulse ox  Block type: popliteal  Injection technique: single-shot  Guidance: ultrasound guided  Local infiltration: ropivacaine  Infiltration strength: 0.5 %  Dose: 20 mL  Needle  Needle gauge: 20 G  Needle localization: ultrasound guidance  Test dose: negative  Assessment  Injection assessment: negative aspiration for heme, no paresthesia on injection, incremental injection, local visualized surrounding nerve on ultrasound and transient paresthesias  Paresthesia pain: immediately resolved  Heart rate change: no  Slow fractionated injection: yes  Additional Notes  0.5% Ropivacaine 20ml with 20mg Depomedrol and Epi wash injected without any problems.

## 2024-05-20 NOTE — BRIEF OP NOTE
Date: 2024  OR Location: [facility]    Name: Merced Vicente, : 2005, Age: 18 y.o., MRN: [id number], Sex: female    Diagnosis  Pre-op Diagnosis     * Achilles tendon contracture, right [M67.01]     * Acquired right flat foot [M21.41]     * Rupture of plantaris tendon, right, initial encounter [S96.811A]     * 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] Post-op Diagnosis     * Achilles tendon contracture, right [M67.01]     * Acquired right flat foot [M21.41]     * Rupture of plantaris tendon, right, initial encounter [S96.811A]     * 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]     Procedures  RIGHT FOOT URBINA CALCANEAL OSTEOTOMY WITH C-ARM  04058 - [place] OSTEOTOMY CALCANEUS W/WO INTERNAL FIXATION    COTTON OSTEOTOMY  83339 - [place] OSTEOTOMY TARSAL BONES OTH/THN CALCANEUS/TALUS    RIGHT LEG GASTROCNEMIUS RECESSION  83604 - [place] GASTROCNEMIUS RECESSION    RIGHT ANKLE POSTERIOR TIBIALIS TENDON REPAIR  76233 - [place] RPR TDN FLXR FOOT 1/2 W/O FREE GRAFG EACH TENDON    RIGHT LEG BONE MARROW ASPIRATE HARVEST  80404 - [place] BONE GRAFT ANY DONOR AREA MINOR/SMALL      Surgeons      * Duane J Ehredt - Primary    Resident/Fellow/Other Assistant:  Surgeons and Role:     * Abril Mora DPM - Resident - Assisting and Keshia MSIII    Procedure Summary  Anesthesia: General  ASA: III  Anesthesia Staff: Anesthesiologist: Sebas Benito MD  CRNA: RENE Hubbard-CRNA  Estimated Blood Loss: 10mL  Intra-op Medications:   Administrations occurring from 0730 to 1100 on 24:   Medication Name Total Dose   sodium chloride 0.9 % irrigation solution 500 mL   calcium chloride 100 mg/mL (10 %) injection 100 mg   thrombin solution 5,000 Units              Anesthesia Record               Intraprocedure I/O Totals          Intake    Dexmedetomidine 0.00 mL    The total shown is the total volume documented since Anesthesia Start was filed.    Tranexamic Acid 0.00 mL    The total shown is the total volume documented since  Anesthesia Start was filed.    Total Intake 0 mL          Specimen: No specimens collected     Staff:   Circulator: Jayde Holguin RN; Rachael Collins RN  Relief Scrub: Raul Gamble RN  Scrub Person: Delta Gomez RN          Findings: Consistent with diagnosis.  Pes planovalgus deformity right foot and subluxing talus medially.  Mild synovitis of the posterior tibial tendon right ankle.    Complications:  None; patient tolerated the procedure well.     Disposition: PACU - hemodynamically stable.  Condition: stable  Specimens Collected: No specimens collected  Attending Attestation: I was present and scrubbed for the entire procedure.    Duane J Ehredt  Phone Number: [phone]

## 2024-05-20 NOTE — ANESTHESIA PROCEDURE NOTES
Peripheral Block    Patient location during procedure: pre-op  Start time: 5/20/2024 7:30 AM  End time: 5/20/2024 7:40 AM  Reason for block: at surgeon's request and post-op pain management  Staffing  Performed: attending   Authorized by: Sebas Benito MD    Performed by: Sebas Benito MD  Preanesthetic Checklist  Completed: patient identified, IV checked, site marked, risks and benefits discussed, surgical consent, monitors and equipment checked, pre-op evaluation and timeout performed   Timeout performed at: 5/20/2024 7:30 AM  Peripheral Block  Patient position: laying flat  Prep: ChloraPrep  Patient monitoring: heart rate, cardiac monitor and continuous pulse ox  Block type: adductor canal  Injection technique: single-shot  Guidance: ultrasound guided  Local infiltration: ropivacaine  Infiltration strength: 0.5 %  Dose: 20 mL  Needle  Needle gauge: 20 G  Needle localization: ultrasound guidance  Test dose: negative  Assessment  Injection assessment: negative aspiration for heme, no paresthesia on injection, incremental injection and local visualized surrounding nerve on ultrasound  Paresthesia pain: none  Heart rate change: no  Slow fractionated injection: yes  Additional Notes  0.5% Ropivacaine 20cc with Epi and Depomedrol 20mg injected without any problem.

## 2024-05-20 NOTE — OP NOTE
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] Post-op Diagnosis     * Achilles tendon contracture, right [M67.01]     * Acquired right flat foot [M21.41]     * Rupture of plantaris tendon, right, initial encounter [S96.811A]     * 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]     Procedures  RIGHT FOOT URBINA CALCANEAL OSTEOTOMY WITH C-ARM  52428 - [place] OSTEOTOMY CALCANEUS W/WO INTERNAL FIXATION    COTTON OSTEOTOMY  36932 - [place] OSTEOTOMY TARSAL BONES OTH/THN CALCANEUS/TALUS    RIGHT LEG GASTROCNEMIUS RECESSION  51383 - [place] GASTROCNEMIUS RECESSION    RIGHT ANKLE POSTERIOR TIBIALIS TENDON REPAIR  09298 - [place] RPR TDN FLXR FOOT 1/2 W/O FREE GRAFG EACH TENDON    RIGHT LEG BONE MARROW ASPIRATE HARVEST  38650 - [place] BONE GRAFT ANY DONOR AREA MINOR/SMALL      Surgeons      * Duane J Ehredt - Primary    Resident/Fellow/Other Assistant:  Surgeons and Role:     * bAril Mora DPM - Resident - Assisting and Keshia MSIII    Procedure Summary  Anesthesia: General  ASA: III  Anesthesia Staff: Anesthesiologist: Sebas Benito MD  CRNA: RENE Hubbard-CRNA  Estimated Blood Loss: 10mL  Intra-op Medications:   Administrations occurring from 0730 to 1100 on 24:   Medication Name Total Dose   sodium chloride 0.9 % irrigation solution 500 mL   calcium chloride 100 mg/mL (10 %) injection 100 mg   thrombin solution 5,000 Units              Anesthesia Record               Intraprocedure  I/O Totals          Intake    Dexmedetomidine 0.00 mL    The total shown is the total volume documented since Anesthesia Start was filed.    LR bolus 1000.00 mL    Tranexamic Acid 0.00 mL    The total shown is the total volume documented since Anesthesia Start was filed.    Total Intake 1000 mL          Specimen: No specimens collected     Staff:   Circulator: Jayde Holguin RN; Rachael Collins RN  Relief Scrub: Raul Gamble RN  Scrub Person: Delta Gomez RN         Drains and/or Catheters: * None in log *    Tourniquet Times:     Total Tourniquet Time Documented:  Leg (Right) - 90 minutes  Total: Leg (Right) - 90 minutes 275 mmHg.  It should be noted that was deflated prior to wound closure and intraoperative hemostasis was achieved.    Implants:  Implants       Type Name Action Serial No.      Graft WEDGE, CIARA, 12MM - [id number] - [id number] Implanted [id number]     Graft WEDGE, JO ANN, 6MM - [id number] - [id number] Implanted [id number]     Screw SCREW, HEADLESS, CRISELDA, LT, MM, 4.0 X 50MM - SN/A - [id number] Implanted N/A     Screw K-WIRE, SINGLE END TROCAR TIP, SMOOTH, 2.3 X 150MM - SN/A - [id number] Used, Not Implanted N/A              Findings: Consistent with diagnosis.  Pes planovalgus of the right foot and mild tenosynovitis of the right posterior tibial tendon    Indications: Merced Vicente is an 18 y.o. 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  Ms. Vicente is a pleasant 18-year-old female well-known to me at the [facility].  She has noted to have a early stage II flatfoot deformity with MRI evidence of tenosynovitis of the right posterior tibial tendon.  The tendon itself preoperatively was noted to be intact but with surrounding synovitis.  She elected for surgical reconstruction of the right foot  and PT tendon repair.    The patient was seen in the preoperative area. The risks, benefits, complications, treatment options, non-operative alternatives, expected recovery and outcomes were discussed with the patient. The possibilities of reaction to medication, pulmonary aspiration, injury to surrounding structures, bleeding, recurrent infection, the need for additional procedures, failure to diagnose a condition, and creating a complication requiring transfusion or operation were discussed with the patient. The patient concurred with the proposed plan, giving informed consent.  The site of surgery was properly noted/marked if necessary per policy. The patient has been actively warmed in preoperative area. Preoperative antibiotics have been ordered and given within 1 hours of incision. Venous thrombosis prophylaxis have been ordered including unilateral sequential compression device    Procedure Details: Patient was brought the operating room placed operatively supine position right lower extremity scrubbed prepped draped you sterile fashion prophylactic antibiotics were administered.  Initially directed our attention to the right anterior proximal tibial area where a small stab incision was made just medial and distal to the tibial tubercle.  I then inserted a Jamshidi needle perpendicular to the medial face of the tibia this was malleted into the medullary space where I then harvested 60 cc of bone marrow aspirate for concentration by the arterial site representative.  The needle was then removed and small wound was flushed with normal sterile saline and closed with 3-0 Prolene and over number fashion.  Next attention was then directed to the right leg medial leg region just distal to the gastrocnemius muscle belly at the level of the gastrocnemius aponeurosis.  A small 3 cm long incision was drawn out here and taken down in anatomical layers the peritenon layer was noted and incised and I bluntly dissected deep  to the peritenon exposing the glistening glistening layer of the gastrocnemius aponeurosis.  Nasal speculum was inserted for retraction and complete visualization of the gastrocnemius was noted from lateral to medial using a 15 blade on a long BP handle maximally dorsiflex the foot and transected the gastrocnemius aponeurosis in a Dominic type fashion.  Complete reduction was noted and the foot was dorsiflexed and greater than neutral to the leg.  The small wound was then flushed and closed the peritenon layer was closed with 2-0 Vicryl over number fashion.  Subcutaneous tissue closed with 2-0 Vicryl in a simple buried fashion and the skin was closed with 4-0 Monocryl in a running subcuticular type fashion.  Next I directed my attention to the right lateral hindfoot region where under radiographic control I I mapped out the anatomy of the anterior process of the calcaneus and the lateral calcaneus.  I made an incision approximately 4 cm in length just proximal to the CC joint this was taken down in anatomical layers to the lateral calcaneal wall I identified the peroneal tendons and sural nerve and retracted these dorsally throughout the entirety of this case.  Full subperiosteal subcapsular dissection performed and I used a combination of power saw and osteotome to make a standard Maguire osteotomy just approximately 1 cm proximal to the CC joint.  This was tricortical in nature and was opened in a wedge fashion using the intermittent distractor.  At this point I inserted gradually larger bone graft trials for the [place] 28 system and noted a 12 mm bone graft fit the patient's anatomy perfectly.  There was noted complete covering of this TN joint with a 12 mm graft.  This graft was then soaked in bone marrow aspirate concentrate and inserted utilized recommend manufactures technique.  I then fixated this graft using a 4 oh headless compression screw from the [place] 28 system this was placed from the anterior  process proximally into the body of the calcaneus and placed under radiographic control.  The screw was drilled measured and inserted utilizing recommend manufactures technique.  Next it was noted that the medial foot had a subtle residual varus deformity therefore we proceeded with the cotton osteotomy the medial cuneiform.  This was identified under radiographic control I made a small proximately 4 cm long incision just dorsal to the medial cuneiform was taken down in anatomical layers and.  Capsule subperiosteal dissection performed I made a standard dorsal tricortical osteotomy in a cotton fashion using a power saw and osteotomes.  I inserted sequentially larger trial spacers noting that the 6 mm wedge was perfect and allowing the first ray to adequately touch a simulated weightbearing surface.  The 6 mm cotton wedge from the [place] 28 system was also soaked in bone marrow aspirate concentrate and tamped into place utilizing recommend manufactures technique.  It was noted here that there was adequate stability with manual manipulation and no formal fixation was required.  At this point I directed my attention to the right medial ankle region where I made a curvilinear incision just distal and around the medial malleolus.  This was taken down in anatomical layers to the level of the posterior tibial tendon.  I incised open the tendon sheath and noted moderate serous synovitis I debrided away all hypertrophic and synovitic tissue.  After this was adequately debrided I noted there was no need for retubularization and I injected the residual tendon with approximately 5 cc of bone marrow aspirate concentrate.  At this point all wounds were then flushed with copious amounts normal sterile saline multiple radiographic images confirmed rectus alignment of the foot as well as hardware position and graft positions.  The periosteal layers were closed with 2-0 Vicryl in a simple buried fashion subcutaneous tissue was  closed with 2-0 Vicryl in a simple buried fashion and all skin incisions were closed with 4-0 Monocryl in a running subcuticular type fashion.  The foot was then cleansed and patted dry Steri-Strips were placed incision site with bacitracin ointment Adaptic dry sterile dressing and 2 layer modified Walters compression bandage placed the right lower extremity with the patient's foot 90 degrees relative to the long axis of the limb.  Complications:  None; patient tolerated the procedure well.    Disposition: PACU - hemodynamically stable.  Condition: stable         Additional Details: 1 g of TXA was given IV at the conclusion of the case.    Attending Attestation: I was present and scrubbed for the entire procedure.    Duane J Ehredt  Phone Number: [phone]

## 2024-05-20 NOTE — ANESTHESIA PROCEDURE NOTES
Airway  Date/Time: 5/20/2024 7:54 AM  Urgency: elective    Airway not difficult    Staffing  Performed: CRNA   Authorized by: Sebas Benito MD    Performed by: RENE Hubbard-KALINA  Patient location during procedure: OR    Indications and Patient Condition  Indications for airway management: anesthesia  Sedation level: deep  Preoxygenated: yes  Patient position: sniffing  MILS maintained throughout  Mask difficulty assessment: 1 - vent by mask  Planned trial extubation    Final Airway Details  Final airway type: endotracheal airway      Successful airway: ETT     Successful intubation technique: video laryngoscopy  Facilitating devices/methods: intubating stylet  Endotracheal tube insertion site: oral  Blade: Caitie  Blade size: #3  ETT size (mm): 7.0  Cormack-Lehane Classification: grade I - full view of glottis  Placement verified by: capnometry   Measured from: lips  ETT to lips (cm): 21  Number of attempts at approach: 1  Number of other approaches attempted: 1

## 2025-03-19 ENCOUNTER — TELEPHONE (OUTPATIENT)
Dept: OPERATING ROOM | Facility: HOSPITAL | Age: 20
End: 2025-03-19

## 2025-03-19 ENCOUNTER — TELEPHONE (OUTPATIENT)
Dept: PAIN MEDICINE | Facility: CLINIC | Age: 20
End: 2025-03-19
Payer: COMMERCIAL

## 2025-03-19 DIAGNOSIS — G90.529 COMPLEX REGIONAL PAIN SYNDROME TYPE 1 OF LOWER EXTREMITY, UNSPECIFIED LATERALITY: Primary | ICD-10-CM

## 2025-03-19 NOTE — TELEPHONE ENCOUNTER
Left vm regarding referral from dr hendricks. Office contact number given to call to schedule new patient appt

## 2025-03-27 PROBLEM — S49.90XA INJURY OF UPPER EXTREMITY: Status: ACTIVE | Noted: 2025-03-27

## 2025-03-27 PROBLEM — E55.9 VITAMIN D INSUFFICIENCY: Status: ACTIVE | Noted: 2025-03-27

## 2025-03-27 PROBLEM — L30.9 ECZEMA: Status: ACTIVE | Noted: 2021-10-05

## 2025-03-27 PROBLEM — L83 ACANTHOSIS NIGRICANS: Status: ACTIVE | Noted: 2017-04-24

## 2025-04-01 ENCOUNTER — OFFICE VISIT (OUTPATIENT)
Dept: PAIN MEDICINE | Facility: CLINIC | Age: 20
End: 2025-04-01
Payer: COMMERCIAL

## 2025-04-01 VITALS
HEIGHT: 61 IN | SYSTOLIC BLOOD PRESSURE: 126 MMHG | BODY MASS INDEX: 48.9 KG/M2 | OXYGEN SATURATION: 100 % | WEIGHT: 259 LBS | HEART RATE: 84 BPM | RESPIRATION RATE: 16 BRPM | DIASTOLIC BLOOD PRESSURE: 80 MMHG

## 2025-04-01 DIAGNOSIS — G90.529 COMPLEX REGIONAL PAIN SYNDROME TYPE 1 OF LOWER EXTREMITY, UNSPECIFIED LATERALITY: ICD-10-CM

## 2025-04-01 PROCEDURE — 99204 OFFICE O/P NEW MOD 45 MIN: CPT | Performed by: ANESTHESIOLOGY

## 2025-04-01 PROCEDURE — 3008F BODY MASS INDEX DOCD: CPT | Performed by: ANESTHESIOLOGY

## 2025-04-01 PROCEDURE — 99214 OFFICE O/P EST MOD 30 MIN: CPT | Performed by: ANESTHESIOLOGY

## 2025-04-01 RX ORDER — AMITRIPTYLINE HYDROCHLORIDE 25 MG/1
TABLET, FILM COATED ORAL
Qty: 30 TABLET | Refills: 11 | Status: SHIPPED | OUTPATIENT
Start: 2025-04-01

## 2025-04-01 RX ORDER — LIDOCAINE 50 MG/G
1 PATCH TOPICAL DAILY
Qty: 30 PATCH | Refills: 0 | Status: SHIPPED | OUTPATIENT
Start: 2025-04-01 | End: 2025-05-01

## 2025-04-01 RX ORDER — ASCORBIC ACID 500 MG
500 TABLET ORAL 2 TIMES DAILY
Qty: 60 TABLET | Refills: 1 | Status: SHIPPED | OUTPATIENT
Start: 2025-04-01 | End: 2025-05-01

## 2025-04-01 ASSESSMENT — PAIN DESCRIPTION - DESCRIPTORS: DESCRIPTORS: SHARP;TINGLING

## 2025-04-01 ASSESSMENT — PAIN - FUNCTIONAL ASSESSMENT: PAIN_FUNCTIONAL_ASSESSMENT: 0-10

## 2025-04-01 ASSESSMENT — PATIENT HEALTH QUESTIONNAIRE - PHQ9
2. FEELING DOWN, DEPRESSED OR HOPELESS: NOT AT ALL
1. LITTLE INTEREST OR PLEASURE IN DOING THINGS: NOT AT ALL
SUM OF ALL RESPONSES TO PHQ9 QUESTIONS 1 AND 2: 0

## 2025-04-01 ASSESSMENT — PAIN SCALES - GENERAL
PAINLEVEL_OUTOF10: 3
PAINLEVEL_OUTOF10: 3

## 2025-04-01 ASSESSMENT — LIFESTYLE VARIABLES: TOTAL SCORE: 1

## 2025-04-01 NOTE — PROGRESS NOTES
Subjective   Patient ID: Merced Vicente is a 19 y.o. female who presents for Foot Pain.  HPI  Patient here today for a new patient evaluation of her right foot pain.  She fell on vacation in July 2023.  She then had surgery with Dr. Claudio Carrington in 2024.  She reports that the surgery was helpful for her and she had a arch in her foot again.  She has many tendon tears in her foot after the fall and a lot of rehab.    She started having different types of pain after the surgery.  She was have numbness, tingling, and sharp pains 1 month post op.  She mentioned it to her surgeon who told it would go away.  She let it be up until recently.  She had to have some ingrown toe nails taken out and the same symptoms flared again.  She now has hypersensitvie skin around the ankle, top and bottom of the foot.  She is unable to wear a tennis shoe or a tight socks.  Any light touch bothers it.  She has been in PT twice per week for over a year to help with pain, and to desensitize her skin.  She has found it to be helpful for her.  She reports her foot is always swollen no matter what she is doing.  She feels that the bottom of her foot will change colors.  She denies and skin temp changes.  She reports decreased ROM of the ankle and toes.  She denies and toe nail changes or hair growth issues.    She has never been on any oral steroids.  She has never been tried on any other medications except Ibuprofen.  She does not use heat or ice.  She has had TENS with pT and it is helpful.  She also finds that dry needling to be helpfuhl for her.    When she does a full day of work her pain is very high and she has a hard time walking and the pain will shoot up the leg.        Review of Systems   Constitutional: Negative.    HENT: Negative.     Eyes: Negative.    Respiratory: Negative.     Cardiovascular: Negative.    Gastrointestinal: Negative.    Endocrine: Negative.    Genitourinary: Negative.    Musculoskeletal:  Positive for gait  problem.   Skin: Negative.    Allergic/Immunologic: Negative.    Hematological: Negative.    Psychiatric/Behavioral: Negative.         Objective   Physical Exam  Vitals and nursing note reviewed.   Constitutional:       Appearance: Normal appearance.   HENT:      Head: Normocephalic and atraumatic.      Right Ear: Ear canal and external ear normal.      Left Ear: Ear canal and external ear normal.      Nose: Nose normal.      Mouth/Throat:      Mouth: Mucous membranes are moist.      Pharynx: Oropharynx is clear.   Eyes:      Conjunctiva/sclera: Conjunctivae normal.      Pupils: Pupils are equal, round, and reactive to light.   Cardiovascular:      Rate and Rhythm: Normal rate.      Pulses:           Dorsalis pedis pulses are 2+ on the left side.        Posterior tibial pulses are 2+ on the left side.   Pulmonary:      Effort: Pulmonary effort is normal. No respiratory distress.   Musculoskeletal:      Cervical back: Normal range of motion and neck supple.      Lumbar back: Tenderness present. Normal range of motion.      Left foot: Decreased range of motion. No deformity or bunion.        Feet:    Feet:      Left foot:      Skin integrity: No ulcer, blister, skin breakdown or dry skin.      Toenail Condition: Left toenails are normal.      Comments: There is swelling around the entire foot.  No skin color changes.  There is significant allodynia noted over the dorsum of the foot and around the ankle.  Along the incisions are extremely painful to the lightest of touch.  Skin:     General: Skin is warm and dry.   Neurological:      Mental Status: She is alert and oriented to person, place, and time.      Sensory: Sensation is intact.      Motor: Motor function is intact.      Coordination: Coordination is intact.      Gait: Gait abnormal.   Psychiatric:         Mood and Affect: Mood normal.         Thought Content: Thought content normal.         Assessment/Plan   Problem List Items Addressed This Visit     None  Visit Diagnoses         Codes    Complex regional pain syndrome type 1 of lower extremity, unspecified laterality     G90.529    Relevant Medications    ascorbic acid (Vitamin C) 500 mg tablet    amitriptyline (Elavil) 25 mg tablet    lidocaine (Lidoderm) 5 % patch          I nice discussion with the patient today our plan will be as follows.    Radiology: All available imaging was reviewed today.      Physically:  Patient should continue with her physical therapy exercises desensitization exercises.      Psychologically: No issues at this time.    Medication: Patient to start vitamin C 500 mg twice a day for the next 60 days.  Patient is start amitriptyline 25 mg half to 1 tablet nightly.  Patient should continue with Lidoderm patches over the dorsum of her foot to help while she is at work.    Duration: Greater than 1 year.    Intervention: We talked about different interventions today including scrambler therapy, peripheral nerve blocks, lumbar sympathetic blocks as well as implanted devices.  At this time the patient would like to focus on more conservative measures.        Please note that this report has been produced using speech recognition software. It may contain errors related to grammar, punctuation or spelling. Electronically signed, but not reviewed.          Keith Oliveira MD 04/01/25 9:24 AM

## 2025-04-03 ASSESSMENT — ENCOUNTER SYMPTOMS
HEMATOLOGIC/LYMPHATIC NEGATIVE: 1
CARDIOVASCULAR NEGATIVE: 1
PSYCHIATRIC NEGATIVE: 1
GASTROINTESTINAL NEGATIVE: 1
RESPIRATORY NEGATIVE: 1
ALLERGIC/IMMUNOLOGIC NEGATIVE: 1
ENDOCRINE NEGATIVE: 1
EYES NEGATIVE: 1
CONSTITUTIONAL NEGATIVE: 1

## 2025-05-01 ENCOUNTER — TELEPHONE (OUTPATIENT)
Dept: PAIN MEDICINE | Facility: CLINIC | Age: 20
End: 2025-05-01
Payer: COMMERCIAL

## 2025-05-01 NOTE — TELEPHONE ENCOUNTER
"Tens unit through [facility] denied    Denial Reason:     \"The standard treatment is with a 2 lead TENS, the notes do not clearly show why you need a 4 leads TENS.     Please advise.   "

## 2025-05-05 ENCOUNTER — APPOINTMENT (OUTPATIENT)
Dept: PAIN MEDICINE | Facility: CLINIC | Age: 20
End: 2025-05-05
Payer: COMMERCIAL

## (undated) DEVICE — DRESSING, NON-ADHERENT, OIL EMULSION, CURITY, 3 X 8 IN, STERILE

## (undated) DEVICE — Device

## (undated) DEVICE — DRAPE COVER, C ARM, FLOUROSCAN IMAGING SYS

## (undated) DEVICE — SPONGE, LAP, XRAY DECT, 18IN X 18IN, W/MASTER DMT, STERILE

## (undated) DEVICE — STOCKINETTE, TUBULAR, DBL PLY, PRECUT, 3 X 36 IN, STERILE

## (undated) DEVICE — BANDAGE, GAUZE, CONFORMING, KERLIX, 6 PLY, 4.5 IN X 4.1 YD

## (undated) DEVICE — BAG, BANDED, 36IN X 28IN

## (undated) DEVICE — OINTMENT, TOPICAL, BACITRACIN

## (undated) DEVICE — DRAPE, TIBURON, SPLIT SHEET, REINF ADH STRIP, 77X108

## (undated) DEVICE — COVER, MAYO STAND, W/PAD, 23 IN, DISPOSABLE, PLASTIC, LF, STERILE

## (undated) DEVICE — CUFF, TOURNIQUET, 34 X 4, DUAL PORT/DUAL BLADDER, BLUE, STERILE

## (undated) DEVICE — BANDAGE, ELASTIC, ACE, ACE, DOUBLE LENGTH, 6 X 550 IN, LF

## (undated) DEVICE — DRAPE, SHEET, 17 X 23 IN

## (undated) DEVICE — BLADE, LONG MEDIUM 25 X 9

## (undated) DEVICE — BANDAGE, COFLEX, 6 X 5 YDS, FOAM TAN, STERILE, LF

## (undated) DEVICE — STRIP, SKIN CLOSURE, STERI-STRIP, REINFORCED, 0.25 X 3 IN

## (undated) DEVICE — KIT, CONVIENCE PREP, ERYTHROCYTE/ MARROW

## (undated) DEVICE — STOCKINETTE, TUBULAR, DBL PLY, PRECUT, 6 X 60 IN, STERILE

## (undated) DEVICE — APPLICATOR, CHLORAPREP, W/ORANGE TINT, 26ML

## (undated) DEVICE — SLEEVE, VASO PRESS, CALF GARMENT, MEDIUM, GREEN

## (undated) DEVICE — DRAPE, SHEET, U, W/ADHESIVE STRIP, IMPERVIOUS, 60 X 70 IN, DISPOSABLE, LF, STERILE

## (undated) DEVICE — DRAPE, SHEET, EXTREMITY, W/ARM BOARD COVERS, 87 X 106 X 128 IN, DISPOSABLE, LF, STERILE